# Patient Record
Sex: MALE | Race: BLACK OR AFRICAN AMERICAN | NOT HISPANIC OR LATINO | Employment: FULL TIME | URBAN - METROPOLITAN AREA
[De-identification: names, ages, dates, MRNs, and addresses within clinical notes are randomized per-mention and may not be internally consistent; named-entity substitution may affect disease eponyms.]

---

## 2023-04-11 ENCOUNTER — HOSPITAL ENCOUNTER (OUTPATIENT)
Dept: RADIOLOGY | Facility: HOSPITAL | Age: 28
Discharge: HOME | End: 2023-04-11
Attending: FAMILY MEDICINE
Payer: COMMERCIAL

## 2023-04-11 ENCOUNTER — OFFICE VISIT (OUTPATIENT)
Dept: URGENT CARE | Facility: CLINIC | Age: 28
End: 2023-04-11
Payer: COMMERCIAL

## 2023-04-11 VITALS
HEART RATE: 60 BPM | RESPIRATION RATE: 16 BRPM | WEIGHT: 157.81 LBS | DIASTOLIC BLOOD PRESSURE: 80 MMHG | TEMPERATURE: 98 F | BODY MASS INDEX: 22.59 KG/M2 | OXYGEN SATURATION: 100 % | SYSTOLIC BLOOD PRESSURE: 130 MMHG | HEIGHT: 70 IN

## 2023-04-11 DIAGNOSIS — R07.9 LEFT-SIDED CHEST PAIN: Primary | ICD-10-CM

## 2023-04-11 DIAGNOSIS — R07.9 LEFT-SIDED CHEST PAIN: ICD-10-CM

## 2023-04-11 PROCEDURE — 99203 OFFICE O/P NEW LOW 30 MIN: CPT | Mod: S$PBB,,, | Performed by: FAMILY MEDICINE

## 2023-04-11 PROCEDURE — 99203 OFFICE O/P NEW LOW 30 MIN: CPT | Mod: PBBFAC,25 | Performed by: FAMILY MEDICINE

## 2023-04-11 PROCEDURE — 99203 PR OFFICE/OUTPT VISIT, NEW, LEVL III, 30-44 MIN: ICD-10-PCS | Mod: S$PBB,,, | Performed by: FAMILY MEDICINE

## 2023-04-11 PROCEDURE — 71046 X-RAY EXAM CHEST 2 VIEWS: CPT | Mod: TC

## 2023-04-11 NOTE — PROGRESS NOTES
"Subjective:      Patient ID: Misael Langston is a 27 y.o. male.    Vitals:  height is 5' 10" (1.778 m) and weight is 71.6 kg (157 lb 12.8 oz). His temperature is 98.2 °F (36.8 °C). His blood pressure is 130/80 and his pulse is 60. His respiration is 16 and oxygen saturation is 100%.     Chief Complaint: Chest wall pain x 1 week.  (States does do "physical work.")    Left sided chest pain, pleuritic, 2 days.  No cough shortness of breath, no exertional chest discomfort.  No specific precipitating event.    Chest Pain   Pertinent negatives include no abdominal pain, cough, fever, hemoptysis or vomiting.     Constitution: Negative for fever.   HENT:  Negative for ear discharge, drooling, facial swelling, sinus pressure, sore throat and trouble swallowing.    Neck: Negative for neck pain and neck stiffness.   Cardiovascular:  Positive for chest pain. Negative for sob on exertion.   Eyes:  Negative for eye pain.   Respiratory:  Negative for chest tightness, cough, bloody sputum and wheezing.    Gastrointestinal:  Negative for abdominal pain, vomiting and diarrhea.   Skin:  Negative for rash.   Neurological:  Negative for altered mental status.   Psychiatric/Behavioral:  Negative for altered mental status.     Objective:     Physical Exam   Constitutional: He appears well-developed.  Non-toxic appearance. He does not appear ill. No distress.   HENT:   Head: Atraumatic.   Nose: No purulent discharge. Right sinus exhibits no maxillary sinus tenderness and no frontal sinus tenderness. Left sinus exhibits no maxillary sinus tenderness and no frontal sinus tenderness.   Eyes: Right eye exhibits no discharge. Left eye exhibits no discharge. Extraocular movement intact   Neck: Neck supple.   Cardiovascular: Regular rhythm.   Pulmonary/Chest: Effort normal and breath sounds normal. No respiratory distress. He has no wheezes. He has no rales. Chest wall is not dull to percussion. He exhibits no tenderness, no bony tenderness, no " crepitus, no edema, no deformity, no swelling and no retraction.   Abdominal: He exhibits no distension. There is no abdominal tenderness.   Lymphadenopathy:     He has no cervical adenopathy.   Neurological: He is alert.   Skin: Skin is warm, dry and not diaphoretic.   Psychiatric: His behavior is normal.   Nursing note and vitals reviewed.  XR CHEST PA AND LATERAL    Result Date: 4/11/2023  EXAMINATION XR CHEST PA AND LATERAL CLINICAL HISTORY Chest pain, unspecified TECHNIQUE A total of 2 images submitted of the chest. COMPARISON None available at the time of initial interpretation. FINDINGS Lines/tubes/devices: none present The cardiomediastinal silhouette and central pulmonary vasculature are unremarkable contour and size.  The trachea is midline. There is no lobar consolidation, pleural effusion, or pneumothorax. There is no acute osseous or extrathoracic abnormality. IMPRESSION No convincing acute radiographic abnormality. Electronically signed by: Jose Jurado Date:    04/11/2023 Time:    19:26     Assessment:     1. Left-sided chest pain        Plan:       Left-sided chest pain  -     XR CHEST PA AND LATERAL; Future; Expected date: 04/11/2023      Discussed what is likely musculoskeletal pain.  Use over-the-counter ibuprofen as needed.  Consider low heat.  Monitor condition closely.  We discussed ER precautions.  He voiced understanding.

## 2023-12-23 ENCOUNTER — HOSPITAL ENCOUNTER (EMERGENCY)
Facility: HOSPITAL | Age: 28
Discharge: LEFT BEFORE DISCHARGE | End: 2023-12-23
Attending: STUDENT IN AN ORGANIZED HEALTH CARE EDUCATION/TRAINING PROGRAM
Payer: COMMERCIAL

## 2023-12-23 VITALS
BODY MASS INDEX: 21.7 KG/M2 | HEIGHT: 71 IN | SYSTOLIC BLOOD PRESSURE: 104 MMHG | WEIGHT: 155 LBS | TEMPERATURE: 98 F | RESPIRATION RATE: 17 BRPM | HEART RATE: 60 BPM | OXYGEN SATURATION: 98 % | DIASTOLIC BLOOD PRESSURE: 61 MMHG

## 2023-12-23 DIAGNOSIS — R07.9 CHEST PAIN: Primary | ICD-10-CM

## 2023-12-23 LAB
ALBUMIN SERPL-MCNC: 4.1 G/DL (ref 3.5–5)
ALBUMIN/GLOB SERPL: 1.4 RATIO (ref 1.1–2)
ALP SERPL-CCNC: 52 UNIT/L (ref 40–150)
ALT SERPL-CCNC: 12 UNIT/L (ref 0–55)
AST SERPL-CCNC: 16 UNIT/L (ref 5–34)
BASOPHILS # BLD AUTO: 0.05 X10(3)/MCL
BASOPHILS NFR BLD AUTO: 0.8 %
BILIRUB SERPL-MCNC: 0.4 MG/DL
BNP BLD-MCNC: <10 PG/ML
BUN SERPL-MCNC: 18 MG/DL (ref 8.9–20.6)
CALCIUM SERPL-MCNC: 9.3 MG/DL (ref 8.4–10.2)
CHLORIDE SERPL-SCNC: 107 MMOL/L (ref 98–107)
CO2 SERPL-SCNC: 25 MMOL/L (ref 22–29)
CREAT SERPL-MCNC: 1.02 MG/DL (ref 0.73–1.18)
D DIMER PPP IA.FEU-MCNC: <0.27 UG/ML FEU (ref 0–0.5)
EOSINOPHIL # BLD AUTO: 0.29 X10(3)/MCL (ref 0–0.9)
EOSINOPHIL NFR BLD AUTO: 4.9 %
ERYTHROCYTE [DISTWIDTH] IN BLOOD BY AUTOMATED COUNT: 14.1 % (ref 11.5–17)
GFR SERPLBLD CREATININE-BSD FMLA CKD-EPI: >60 MLS/MIN/1.73/M2
GLOBULIN SER-MCNC: 3 GM/DL (ref 2.4–3.5)
GLUCOSE SERPL-MCNC: 102 MG/DL (ref 74–100)
HCT VFR BLD AUTO: 42.8 % (ref 42–52)
HGB BLD-MCNC: 14.6 G/DL (ref 14–18)
IMM GRANULOCYTES # BLD AUTO: 0.02 X10(3)/MCL (ref 0–0.04)
IMM GRANULOCYTES NFR BLD AUTO: 0.3 %
INR PPP: 1
LYMPHOCYTES # BLD AUTO: 2.91 X10(3)/MCL (ref 0.6–4.6)
LYMPHOCYTES NFR BLD AUTO: 49.2 %
MCH RBC QN AUTO: 24.9 PG (ref 27–31)
MCHC RBC AUTO-ENTMCNC: 34.1 G/DL (ref 33–36)
MCV RBC AUTO: 72.9 FL (ref 80–94)
MONOCYTES # BLD AUTO: 0.59 X10(3)/MCL (ref 0.1–1.3)
MONOCYTES NFR BLD AUTO: 10 %
NEUTROPHILS # BLD AUTO: 2.06 X10(3)/MCL (ref 2.1–9.2)
NEUTROPHILS NFR BLD AUTO: 34.8 %
NRBC BLD AUTO-RTO: 0 %
PLATELET # BLD AUTO: 165 X10(3)/MCL (ref 130–400)
PMV BLD AUTO: 10.6 FL (ref 7.4–10.4)
POTASSIUM SERPL-SCNC: 4 MMOL/L (ref 3.5–5.1)
PROT SERPL-MCNC: 7.1 GM/DL (ref 6.4–8.3)
PROTHROMBIN TIME: 12.6 SECONDS (ref 12.5–14.5)
RBC # BLD AUTO: 5.87 X10(6)/MCL (ref 4.7–6.1)
SODIUM SERPL-SCNC: 139 MMOL/L (ref 136–145)
TROPONIN I SERPL-MCNC: <0.01 NG/ML (ref 0–0.04)
TROPONIN I SERPL-MCNC: <0.01 NG/ML (ref 0–0.04)
WBC # SPEC AUTO: 5.92 X10(3)/MCL (ref 4.5–11.5)

## 2023-12-23 PROCEDURE — 85610 PROTHROMBIN TIME: CPT | Performed by: EMERGENCY MEDICINE

## 2023-12-23 PROCEDURE — 93010 EKG 12-LEAD: ICD-10-PCS | Mod: ,,, | Performed by: INTERNAL MEDICINE

## 2023-12-23 PROCEDURE — 93010 ELECTROCARDIOGRAM REPORT: CPT | Mod: ,,, | Performed by: INTERNAL MEDICINE

## 2023-12-23 PROCEDURE — 80053 COMPREHEN METABOLIC PANEL: CPT | Performed by: EMERGENCY MEDICINE

## 2023-12-23 PROCEDURE — 85379 FIBRIN DEGRADATION QUANT: CPT | Performed by: STUDENT IN AN ORGANIZED HEALTH CARE EDUCATION/TRAINING PROGRAM

## 2023-12-23 PROCEDURE — 84484 ASSAY OF TROPONIN QUANT: CPT | Performed by: EMERGENCY MEDICINE

## 2023-12-23 PROCEDURE — 99285 EMERGENCY DEPT VISIT HI MDM: CPT | Mod: 25

## 2023-12-23 PROCEDURE — 85025 COMPLETE CBC W/AUTO DIFF WBC: CPT | Performed by: EMERGENCY MEDICINE

## 2023-12-23 PROCEDURE — 83880 ASSAY OF NATRIURETIC PEPTIDE: CPT | Performed by: EMERGENCY MEDICINE

## 2023-12-23 PROCEDURE — 93005 ELECTROCARDIOGRAM TRACING: CPT

## 2023-12-23 PROCEDURE — 84484 ASSAY OF TROPONIN QUANT: CPT | Performed by: STUDENT IN AN ORGANIZED HEALTH CARE EDUCATION/TRAINING PROGRAM

## 2023-12-23 NOTE — ED PROVIDER NOTES
Encounter Date: 12/23/2023    SCRIBE #1 NOTE: I, Areli Becerra, am scribing for, and in the presence of,  Abimael Pope MD. I have scribed the following portions of the note - the EKG reading. Other sections scribed: HPI, ROS, PE.       History     Chief Complaint   Patient presents with    Chest Pain     Pt presents to the ER for left sided Chest pain. Started roughly 1 hour ago. Constant stabbing pain. Seen last year for same complaint with negative work up. Denies drug use. Admits to smoking cigs.      28 year old male with no pertinent medical history presents to ED for intermittent, left-sided chest pain waking him from sleep earlier today. Pt describes pain as stabbing that worsen with deep breathing and each episode lasted for a few seconds. Denies cough, nausea, vomiting, or any other complaints at this time. Denies family cardiac history. States he smokes occasionally.    The history is provided by the patient. No  was used.     Review of patient's allergies indicates:  No Known Allergies  No past medical history on file.  No past surgical history on file.  No family history on file.  Social History     Tobacco Use    Smoking status: Every Day     Types: Cigars    Smokeless tobacco: Never    Tobacco comments:     2 cigars per day   Substance Use Topics    Alcohol use: Never    Drug use: Never     Review of Systems   Constitutional:  Negative for chills, fatigue and fever.   HENT:  Negative for congestion, ear discharge, ear pain, nosebleeds, rhinorrhea and sore throat.    Eyes:  Negative for pain, redness and visual disturbance.   Respiratory:  Negative for cough, chest tightness and shortness of breath.    Cardiovascular:  Positive for chest pain. Negative for leg swelling.   Gastrointestinal:  Negative for abdominal pain, blood in stool, constipation, diarrhea, nausea and vomiting.   Genitourinary:  Negative for dysuria and hematuria.   Musculoskeletal:  Negative for arthralgias,  joint swelling, myalgias and neck pain.   Skin:  Negative for color change, pallor and wound.   Neurological:  Negative for dizziness, weakness, light-headedness, numbness and headaches.       Physical Exam     Initial Vitals [12/23/23 0153]   BP Pulse Resp Temp SpO2   (!) 134/90 91 18 98 °F (36.7 °C) 98 %      MAP       --         Physical Exam    Constitutional: He appears well-developed and well-nourished. He is not diaphoretic. No distress.   HENT:   Head: Normocephalic and atraumatic.   Right Ear: External ear normal.   Left Ear: External ear normal.   Nose: Nose normal.   Eyes: EOM are normal. Pupils are equal, round, and reactive to light. Right eye exhibits no discharge. Left eye exhibits no discharge.   Cardiovascular:  Normal rate, regular rhythm and normal heart sounds.     Exam reveals no gallop and no friction rub.       No murmur heard.  Pulmonary/Chest: Effort normal and breath sounds normal. No respiratory distress. He has no wheezes. He has no rhonchi. He has no rales. He exhibits no tenderness.   Chest pain non-reproducible to palpation   Abdominal: Abdomen is soft. Bowel sounds are normal. He exhibits no distension and no mass. There is no abdominal tenderness. There is no rebound and no guarding.   Musculoskeletal:         General: No edema. Normal range of motion.     Neurological: He is alert and oriented to person, place, and time. No cranial nerve deficit or sensory deficit.   Skin: Skin is warm and dry. Capillary refill takes less than 2 seconds.         ED Course   Procedures  Labs Reviewed   COMPREHENSIVE METABOLIC PANEL - Abnormal; Notable for the following components:       Result Value    Glucose Level 102 (*)     All other components within normal limits   CBC WITH DIFFERENTIAL - Abnormal; Notable for the following components:    MCV 72.9 (*)     MCH 24.9 (*)     MPV 10.6 (*)     Neut # 2.06 (*)     All other components within normal limits   B-TYPE NATRIURETIC PEPTIDE - Normal    TROPONIN I - Normal   PROTIME-INR - Normal   TROPONIN I - Normal   D DIMER, QUANTITATIVE - Normal   CBC W/ AUTO DIFFERENTIAL    Narrative:     The following orders were created for panel order CBC auto differential.  Procedure                               Abnormality         Status                     ---------                               -----------         ------                     CBC with Differential[[id number]]       Abnormal            Final result                 Please view results for these tests on the individual orders.     EKG Readings: (Independently Interpreted)   Initial Reading: No STEMI. Rhythm: Normal Sinus Rhythm. Heart Rate: 73. Ectopy: No Ectopy. ST Segments: Normal ST Segments. T Waves: Normal. Axis: Normal. Clinical Impression: Normal Sinus Rhythm   Performed at 01:50 on 12/23/2023.    High voltage QRS complexes       Imaging Results              X-Ray Chest PA And Lateral (Final result)  Result time 12/23/23 08:38:09      Final result by Dany Joseph MD (12/23/23 08:38:09)                   Impression:      No acute cardiopulmonary abnormality.      Electronically signed by: Dany Joseph  Date:    12/23/2023  Time:    08:38               Narrative:    EXAMINATION:  XR CHEST PA AND LATERAL    CLINICAL HISTORY:  Chest Pain;    COMPARISON:  11 April 2023    FINDINGS:  PA and lateral views of the chest were obtained. Heart and mediastinum within normal limits. The lungs are clear. No pneumothorax or significant effusion.                                       Medications - No data to display  Medical Decision Making        The differential diagnosis includes, but is not limited to abrasion, contusion, fracture, traumatic ICH, TBI, concussion, spinal injury, fracture, pneumothorax, hemothorax, intrathoracic injury, intraabdominal injury, hemorrhage, laceration     Patient is awake alert well-appearing.  Resting comfortably.  NAD.  No respiratory distress.  His initial troponin is  undetectable.  His dimer is undetectable.  While awaiting his delta troponin he evidently he is eloped from the emergency department.  Admittedly was stable.  Well-appearing.  Vitals and a concerning.  Physical exam is benign.  Possibly precordial catch syndrome versus something musculoskeletal.      Amount and/or Complexity of Data Reviewed  External Data Reviewed: notes.     Details: Chart reviews unrevealing seen for left-sided chest pain 04/11/2023 at outside hospital.  Labs: ordered. Decision-making details documented in ED Course.  Radiology: ordered and independent interpretation performed. Decision-making details documented in ED Course.  ECG/medicine tests: ordered and independent interpretation performed.    Risk  OTC drugs.            Scribe Attestation:   Scribe #1: I performed the above scribed service and the documentation accurately describes the services I performed. I attest to the accuracy of the note.    Attending Attestation:           Physician Attestation for Scribe:  Physician Attestation Statement for Scribe #1: I, Abimael Pope MD, reviewed documentation, as scribed by Areli Becerra in my presence, and it is both accurate and complete.             ED Course as of 12/23/23 0934   Sat Dec 23, 2023   0548 Troponin I: <0.010 [MM]   0548 BNP: <10.0 [MM]   0641 Troponin I: <0.010 [MM]   0641 BNP: <10.0 [MM]   0641 INR: 1.0 [MM]   0641 X-Ray Chest PA And Lateral  Negative for acute [MM]   0724 D-Dimer: <0.27 [MM]      ED Course User Index  [MM] Abimael Pope MD                           Clinical Impression:  Final diagnoses:  [R07.9] Chest pain (Primary)          ED Disposition Condition    Eloped                 Abimael Pope MD  12/23/23 0934

## 2024-10-13 ENCOUNTER — HOSPITAL ENCOUNTER (EMERGENCY)
Facility: HOSPITAL | Age: 29
Discharge: HOME | End: 2024-10-14
Attending: EMERGENCY MEDICINE

## 2024-10-13 VITALS
DIASTOLIC BLOOD PRESSURE: 86 MMHG | WEIGHT: 158.94 LBS | SYSTOLIC BLOOD PRESSURE: 136 MMHG | HEIGHT: 71 IN | OXYGEN SATURATION: 98 % | RESPIRATION RATE: 18 BRPM | HEART RATE: 69 BPM | TEMPERATURE: 98 F | BODY MASS INDEX: 22.25 KG/M2

## 2024-10-13 DIAGNOSIS — K64.4 EXTERNAL HEMORRHOID: Primary | ICD-10-CM

## 2024-10-13 LAB
ALBUMIN SERPL-MCNC: 4 G/DL (ref 3.5–5)
ALBUMIN/GLOB SERPL: 1.2 RATIO (ref 1.1–2)
ALP SERPL-CCNC: 52 UNIT/L (ref 40–150)
ALT SERPL-CCNC: 13 UNIT/L (ref 0–55)
ANION GAP SERPL CALC-SCNC: 8 MEQ/L
ANISOCYTOSIS BLD QL SMEAR: ABNORMAL
AST SERPL-CCNC: 19 UNIT/L (ref 5–34)
BASOPHILS # BLD AUTO: 0.05 X10(3)/MCL
BASOPHILS NFR BLD AUTO: 1.1 %
BILIRUB SERPL-MCNC: 0.4 MG/DL
BUN SERPL-MCNC: 10.9 MG/DL (ref 8.9–20.6)
CALCIUM SERPL-MCNC: 9.5 MG/DL (ref 8.4–10.2)
CHLORIDE SERPL-SCNC: 104 MMOL/L (ref 98–107)
CO2 SERPL-SCNC: 26 MMOL/L (ref 22–29)
CREAT SERPL-MCNC: 0.98 MG/DL (ref 0.73–1.18)
CREAT/UREA NIT SERPL: 11
CRP SERPL-MCNC: <1 MG/L
EOSINOPHIL # BLD AUTO: 0.38 X10(3)/MCL (ref 0–0.9)
EOSINOPHIL NFR BLD AUTO: 8.2 %
ERYTHROCYTE [DISTWIDTH] IN BLOOD BY AUTOMATED COUNT: 14.2 % (ref 11.5–17)
GFR SERPLBLD CREATININE-BSD FMLA CKD-EPI: >60 ML/MIN/1.73/M2
GLOBULIN SER-MCNC: 3.3 GM/DL (ref 2.4–3.5)
GLUCOSE SERPL-MCNC: 109 MG/DL (ref 74–100)
HCT VFR BLD AUTO: 41.7 % (ref 42–52)
HGB BLD-MCNC: 14.5 G/DL (ref 14–18)
IMM GRANULOCYTES # BLD AUTO: 0.01 X10(3)/MCL (ref 0–0.04)
IMM GRANULOCYTES NFR BLD AUTO: 0.2 %
LACTATE SERPL-SCNC: 0.8 MMOL/L (ref 0.5–2.2)
LYMPHOCYTES # BLD AUTO: 1.89 X10(3)/MCL (ref 0.6–4.6)
LYMPHOCYTES NFR BLD AUTO: 41 %
MCH RBC QN AUTO: 24.2 PG (ref 27–31)
MCHC RBC AUTO-ENTMCNC: 34.8 G/DL (ref 33–36)
MCV RBC AUTO: 69.6 FL (ref 80–94)
MONOCYTES # BLD AUTO: 0.51 X10(3)/MCL (ref 0.1–1.3)
MONOCYTES NFR BLD AUTO: 11.1 %
NEUTROPHILS # BLD AUTO: 1.77 X10(3)/MCL (ref 2.1–9.2)
NEUTROPHILS NFR BLD AUTO: 38.4 %
NRBC BLD AUTO-RTO: 0 %
PLATELET # BLD AUTO: 186 X10(3)/MCL (ref 130–400)
PLATELET # BLD EST: ADEQUATE 10*3/UL
PMV BLD AUTO: 10.1 FL (ref 7.4–10.4)
POTASSIUM SERPL-SCNC: 3.9 MMOL/L (ref 3.5–5.1)
PROT SERPL-MCNC: 7.3 GM/DL (ref 6.4–8.3)
RBC # BLD AUTO: 5.99 X10(6)/MCL (ref 4.7–6.1)
RBC MORPH BLD: ABNORMAL
SODIUM SERPL-SCNC: 138 MMOL/L (ref 136–145)
WBC # BLD AUTO: 4.61 X10(3)/MCL (ref 4.5–11.5)

## 2024-10-13 PROCEDURE — 96360 HYDRATION IV INFUSION INIT: CPT

## 2024-10-13 PROCEDURE — 85025 COMPLETE CBC W/AUTO DIFF WBC: CPT | Performed by: EMERGENCY MEDICINE

## 2024-10-13 PROCEDURE — 80053 COMPREHEN METABOLIC PANEL: CPT | Performed by: EMERGENCY MEDICINE

## 2024-10-13 PROCEDURE — 83605 ASSAY OF LACTIC ACID: CPT | Performed by: EMERGENCY MEDICINE

## 2024-10-13 PROCEDURE — 86140 C-REACTIVE PROTEIN: CPT | Performed by: EMERGENCY MEDICINE

## 2024-10-13 PROCEDURE — 25000003 PHARM REV CODE 250: Performed by: EMERGENCY MEDICINE

## 2024-10-13 PROCEDURE — 87040 BLOOD CULTURE FOR BACTERIA: CPT | Performed by: EMERGENCY MEDICINE

## 2024-10-13 PROCEDURE — 25500020 PHARM REV CODE 255: Performed by: EMERGENCY MEDICINE

## 2024-10-13 PROCEDURE — 99285 EMERGENCY DEPT VISIT HI MDM: CPT | Mod: 25

## 2024-10-13 RX ADMIN — IOHEXOL 100 ML: 350 INJECTION, SOLUTION INTRAVENOUS at 10:10

## 2024-10-13 RX ADMIN — SODIUM CHLORIDE 1000 ML: 9 INJECTION, SOLUTION INTRAVENOUS at 10:10

## 2024-10-14 RX ORDER — HYDROCORTISONE ACETATE 25 MG/1
25 SUPPOSITORY RECTAL 2 TIMES DAILY
Qty: 20 SUPPOSITORY | Refills: 0 | Status: SHIPPED | OUTPATIENT
Start: 2024-10-14 | End: 2024-10-24

## 2024-10-14 NOTE — ED PROVIDER NOTES
"Encounter Date: 10/13/2024       History     Chief Complaint   Patient presents with    Rectal Pain     Pt reports pain in rectal area from "cyst or bump" in that area. Pt reports last BM yesterday. Denies constipation. Onset 2 days ago.     External hemorrhoid versus perirectal abscess; no systemic inflammatory symptoms, signs;        Review of patient's allergies indicates:  No Known Allergies  History reviewed. No pertinent past medical history.  History reviewed. No pertinent surgical history.  No family history on file.  Social History     Tobacco Use    Smoking status: Every Day     Types: Cigars    Smokeless tobacco: Never    Tobacco comments:     2 cigars per day   Substance Use Topics    Alcohol use: Never    Drug use: Never     Review of Systems    Physical Exam     Initial Vitals [10/13/24 2026]   BP Pulse Resp Temp SpO2   136/86 69 18 98.3 °F (36.8 °C) 98 %      MAP       --         Physical Exam    Nursing note and vitals reviewed.  Constitutional: He appears well-developed and well-nourished. He is not diaphoretic. No distress.   HENT:   Head: Normocephalic and atraumatic.   Eyes: EOM are normal. Pupils are equal, round, and reactive to light. Right eye exhibits no discharge. Left eye exhibits no discharge.   Neck: Neck supple. No thyromegaly present. No tracheal deviation present. No JVD present.   Normal range of motion.  Cardiovascular:  Normal rate, regular rhythm, normal heart sounds and intact distal pulses.           No murmur heard.  Pulmonary/Chest: Breath sounds normal. No stridor. No respiratory distress. He has no wheezes. He has no rhonchi. He has no rales.   Abdominal: Abdomen is soft. He exhibits no distension. There is no abdominal tenderness. There is no rebound and no guarding.   Genitourinary:    Genitourinary Comments: External hemmorhoid ;     Musculoskeletal:         General: No tenderness or edema. Normal range of motion.      Cervical back: Normal range of motion and neck " supple.     Neurological: He is alert and oriented to person, place, and time. He has normal strength. No cranial nerve deficit. GCS score is 15. GCS eye subscore is 4. GCS verbal subscore is 5. GCS motor subscore is 6.   Skin: Skin is warm and dry. Capillary refill takes less than 2 seconds. No rash and no abscess noted. No erythema. No pallor.   Psychiatric: He has a normal mood and affect. His behavior is normal. Judgment and thought content normal.         ED Course   Procedures  Labs Reviewed   COMPREHENSIVE METABOLIC PANEL - Abnormal       Result Value    Sodium 138      Potassium 3.9      Chloride 104      CO2 26      Glucose 109 (*)     Blood Urea Nitrogen 10.9      Creatinine 0.98      Calcium 9.5      Protein Total 7.3      Albumin 4.0      Globulin 3.3      Albumin/Globulin Ratio 1.2      Bilirubin Total 0.4      ALP 52      ALT 13      AST 19      eGFR >60      Anion Gap 8.0      BUN/Creatinine Ratio 11     CBC WITH DIFFERENTIAL - Abnormal    WBC 4.61      RBC 5.99      Hgb 14.5      Hct 41.7 (*)     MCV 69.6 (*)     MCH 24.2 (*)     MCHC 34.8      RDW 14.2      Platelet 186      MPV 10.1      Neut % 38.4      Lymph % 41.0      Mono % 11.1      Eos % 8.2      Basophil % 1.1      Lymph # 1.89      Neut # 1.77 (*)     Mono # 0.51      Eos # 0.38      Baso # 0.05      IG# 0.01      IG% 0.2      NRBC% 0.0     BLOOD SMEAR MICROSCOPIC EXAM ([facility]) - Abnormal    RBC Morph Abnormal (*)     Anisocytosis 1+ (*)     Platelets Adequate     LACTIC ACID, PLASMA - Normal    Lactic Acid Level 0.8     C-REACTIVE PROTEIN - Normal    CRP <1.00     BLOOD CULTURE [facility]   BLOOD CULTURE [facility]   CBC W/ AUTO DIFFERENTIAL    Narrative:     The following orders were created for panel order CBC auto differential.  Procedure                               Abnormality         Status                     ---------                               -----------         ------                     CBC with Differential[[id number]]       Abnormal             Final result                 Please view results for these tests on the individual orders.   URINALYSIS, REFLEX TO URINE CULTURE          Imaging Results              CT Abdomen Pelvis With IV Contrast NO Oral Contrast (Preliminary result)  Result time 10/13/24 23:26:09      Preliminary result by Ayan Manning MD (10/13/24 23:26:09)                   Narrative:    START OF REPORT:  Technique: CT of the abdomen and pelvis was performed with axial images as well as sagittal and coronal reconstruction images with intravenous contrast.    Comparison: None available.    Clinical History: Rectal Pain (Pt reports pain in rectal area from cyst or bump in that area. Pt reports last BM yesterday. Denies constipation. Onset 2 days ago.    Dosage Information: Automated Exposure Control was utilized.    Findings:  Lines and Tubes: None.  Thorax:  Lungs: The visualized lung bases appear unremarkable. No focal infiltrate or consolidation is seen.  Pleura: No effusions or thickening.  Heart: The heart size is within normal limits.  Abdomen:  Abdominal Wall: No abdominal wall pathology is seen.  Liver: The liver appears unremarkable.  Biliary System: No intrahepatic or extrahepatic biliary duct dilatation is seen.  Gallbladder: The gallbladder is non-distended and appears otherwise unremarkable.  Pancreas: The pancreas appears unremarkable.  Spleen: The spleen appears unremarkable.  Adrenals: The adrenal glands appear unremarkable.  Kidneys: The kidneys appear unremarkable with no stones cysts masses or hydronephrosis.  Aorta: The abdominal aorta appears unremarkable.  IVC: Unremarkable.  Bowel:  Esophagus: The visualized esophagus appears unremarkable.  Stomach: The stomach appears unremarkable.  Duodenum: Unremarkable appearing duodenum.  Small Bowel: The small bowel appears unremarkable.  Colon: Nondistended.  Appendix: The appendix appears unremarkable and is seen on Image 102, Series 2 through Image 86, Series  2.  Peritoneum: No intraperitoneal free air or ascites is seen.    Pelvis:  Bladder: The bladder appears unremarkable.  Male:  Prostate gland: The prostate gland appears unremarkable.    Bony structures:  Dorsal Spine: The visualized dorsal spine appears unremarkable.  Bony Pelvis: The visualized bony structures of the pelvis appear unremarkable.      Impression:  1. No acute intraabdominal or pelvic solid organ or bowel pathology identified. Details and other findings as discussed above.                                         Medications   sodium chloride 0.9% bolus 1,000 mL 1,000 mL (0 mLs Intravenous Stopped 10/13/24 2302)   iohexoL (OMNIPAQUE 350) injection 100 mL (100 mLs Intravenous Given 10/13/24 2249)     Medical Decision Making  Rectal pain and palpable mass seemingly compatible with hemorrhoid though sufficiently atypical features found sufficient to warrant expanded evaluation.    Amount and/or Complexity of Data Reviewed  Labs: ordered. Decision-making details documented in ED Course.     Details: As above;  Radiology: ordered and independent interpretation performed. Decision-making details documented in ED Course.     Details: No acute abnormal on CT imaging;  Discussion of management or test interpretation with external provider(s): General surgery team has consulted, finds the constellation of features convincing as hemorrhoid;    Risk  Prescription drug management.  Risk Details: Home with medications, anticipatory guidance, return precautions, follow-up instructions.  Discharged in stable condition without event.               ED Course as of 10/14/24 0043   Sun Oct 13, 2024   2234 Normal lactate; [CT]   2312 Reassuring chemistries; [CT]   2312 Reassuring hemogram; [CT]   2312 Negative lactate; [CT]   2312 Negative CRP; [CT]      ED Course User Index  [CT] Tuan Myers MD                           Clinical Impression:  Final diagnoses:  [K64.4] External hemorrhoid (Primary)          ED  Disposition Condition    Discharge Stable          ED Prescriptions       Medication Sig Dispense Start Date End Date Auth. Provider    hydrocortisone (ANUSOL-HC) 25 mg suppository Place 1 suppository (25 mg total) rectally 2 (two) times daily. for 10 days 20 suppository 10/14/2024 10/24/2024 Tuan Myers MD          Follow-up Information       Follow up With Specialties Details Why Contact Info    [facility] Emergency Medicine  As needed, If symptoms worsen [address] [facility] [id number] [phone]             Tuan Myers MD  10/14/24 0043

## 2024-10-14 NOTE — CONSULTS
"[facility]  General Surgery - [facility] Team  H&P Note  Admit Date: 10/13/2024  HD#0    Patient Name: Misael Langston  YOB: 1995  Date: 10/14/2024 12:09 AM  Date of Admission: 10/13/2024    PRESENTING HISTORY   Chief Complaint/Reason for Admission:     History of Present Illness: Mr. Misael Langston is a 29 y.o. male with no PMHx presenting to the ED due to perianal mass and pain. He states he first noticed it yesterday and that it grew today prompting him to come to the ED. This is the first time he has noticed perianal mass. He denies any blood per rectum or blood in stool. His last bowel movement was yesterday (10/12). States he does not have issues with bowel movements at baseline - no history of constipation, straining, prolonged time on toilet.  He denies fevers, chills, nausea, vomiting, diarrhea, or constipation.     Review of Systems:  12 point ROS negative except as stated in HPI    PAST HISTORY:   Past medical history:  History reviewed. No pertinent past medical history.    Past surgical history:  History reviewed. No pertinent surgical history.    Social history:  Tobacco: 1 pack per day for 10 years, states he quit 2 months ago  Alcohol: None  Drug use: None    MEDICATIONS & ALLERGIES:   Home meds: None    Allergies: No known drug allergies    OBJECTIVE:   Vital Signs:  VITAL SIGNS: 24 HR MIN & MAX LAST   Temp  Min: 98.3 °F (36.8 °C)  Max: 98.3 °F (36.8 °C)  98.3 °F (36.8 °C)   BP  Min: 136/86  Max: 136/86  136/86    Pulse  Min: 69  Max: 69  69    Resp  Min: 18  Max: 18  18    SpO2  Min: 98 %  Max: 98 %  98 %      HT: 5' 11" (180.3 cm)  WT: 72.1 kg (158 lb 15.2 oz)  BMI: 22.2     Intake/output:    Intake/Output Summary (Last 24 hours) at 10/14/2024 0043  Last data filed at 10/13/2024 2302  Gross per 24 hour   Intake 1000 ml   Output --   Net 1000 ml        Physical Exam:  General: Well developed, well nourished, no acute distress  HEENT: normocephalic, atraumtatic  CV: " RR  Resp: NWOB on room air  GI:  S/NT/ND  :  left lateral external hemorrhoid mild tenderness to palpation, rectal exam with good tone, no blood, no mass or fluctuance noted, no fissures identified externally  MSK: Moves all extremities spontaneously and purposefully  Skin/wounds:  No rashes, ulcers, erythema  Neuro:  CNII-XII grossly intact, A&Ox3    Labs:  Recent Labs     10/13/24  2156   WBC 4.61   HGB 14.5   HCT 41.7*         K 3.9      CO2 26   BUN 10.9   CREATININE 0.98   BILITOT 0.4   AST 19   ALT 13   ALKPHOS 52   CALCIUM 9.5   ALBUMIN 4.0       Imaging:  CT Abdomen Pelvis With IV Contrast NO Oral Contrast            I have reviewed all pertinent imaging results/findings within the past 24 hours.    Micro/Path:  Blood cultures x2 - In process    ASSESSMENT & PLAN:    Mr. Misael Langston is a 29 y.o. male with no PMHx presenting to the ED with perianal mass and pain. General surgery consulted for evaluation of lesion. No acute findings on CT. On physical exam, findings consistent with external hemorrhoid.     - no acute surgical intervention indicated at this time  - will see patient in surgery clinic for follow-up  - recommend non-operative management with fiber supplements, increased water intake, stool softener, sitz baths and pain control  - please call with any questions or concerns     Maribell Saeed MS-3    I have seen and examined the patient with the medical student. I agree with the assessment and plan and have made the appropriate edits to the note above.     Emmy Dodson MD  [facility] PGY-1  10/08/2024    [facility]

## 2024-10-14 NOTE — MEDICAL/APP STUDENT
"[facility]  General Surgery - [facility] Team  H&P Note  Admit Date: 10/13/2024  HD#0    Patient Name: Misael Langston  YOB: 1995  Date: 10/14/2024 12:09 AM  Date of Admission: 10/13/2024    PRESENTING HISTORY   Chief Complaint/Reason for Admission:     History of Present Illness: Mr. Misael Langston is a 29 y.o. male with no PMHx presenting to the ED due to anal... He states he first noticed it yesterday and that it grew today prompting him to come to the ED. This is the first time he has noticed anal... He denies any blood from anal... or blood in stool. His last bowel movement was yesterday (10/12). He denies fevers, chills, nausea, vomiting, diarrhea, or constipation.       Review of Systems:  12 point ROS negative except as stated in HPI    PAST HISTORY:   Past medical history:  History reviewed. No pertinent past medical history.    Past surgical history:  History reviewed. No pertinent surgical history.    Social history:  Tobacco: 1 pack per day for 10 years, states he quit 2 months ago  Alcohol: None  Drug use: None    MEDICATIONS & ALLERGIES:   Home meds: None    Allergies: No known drug allergies    OBJECTIVE:   Vital Signs:  VITAL SIGNS: 24 HR MIN & MAX LAST   Temp  Min: 98.3 °F (36.8 °C)  Max: 98.3 °F (36.8 °C)  98.3 °F (36.8 °C)   BP  Min: 136/86  Max: 136/86  136/86    Pulse  Min: 69  Max: 69  69    Resp  Min: 18  Max: 18  18    SpO2  Min: 98 %  Max: 98 %  98 %      HT: 5' 11" (180.3 cm)  WT: 72.1 kg (158 lb 15.2 oz)  BMI: 22.2     Intake/output:    Intake/Output Summary (Last 24 hours) at 10/14/2024 0016  Last data filed at 10/13/2024 2302  Gross per 24 hour   Intake 1000 ml   Output --   Net 1000 ml        Physical Exam:  General: Well developed, well nourished, no acute distress  HEENT: NCAT, PERRL  CV: RR  Resp: NWOB on room air  GI:  S/NT/ND  :    MSK: Moves all extremities spontaneously and purposefully  Skin/wounds:  No rashes, ulcers, erythema  Neuro:  CNII-XII " grossly intact, A&Ox3    Labs:  Recent Labs     10/13/24  2156   WBC 4.61   HGB 14.5   HCT 41.7*         K 3.9      CO2 26   BUN 10.9   CREATININE 0.98   BILITOT 0.4   AST 19   ALT 13   ALKPHOS 52   CALCIUM 9.5   ALBUMIN 4.0       Imaging:  CT Abdomen Pelvis With IV Contrast NO Oral Contrast            I have reviewed all pertinent imaging results/findings within the past 24 hours.    Micro/Path:  Blood cultures x2 - In process    ASSESSMENT & PLAN:    Mr. Misael Langston is a 29 y.o. male with no PMHx presenting to the ED due to anal    - MMPC   - Diet:   - Anti-emesis:   - Bowel regimen:   - PT/OT    DVT ppx:     Dispo:    Maribell Saeed MS-3  [employer] General Surgery

## 2024-10-19 LAB
BACTERIA BLD CULT: NORMAL
BACTERIA BLD CULT: NORMAL